# Patient Record
Sex: FEMALE | Race: WHITE
[De-identification: names, ages, dates, MRNs, and addresses within clinical notes are randomized per-mention and may not be internally consistent; named-entity substitution may affect disease eponyms.]

---

## 2020-09-28 ENCOUNTER — HOSPITAL ENCOUNTER (EMERGENCY)
Dept: HOSPITAL 84 - D.ER | Age: 35
Discharge: HOME | End: 2020-09-28
Payer: COMMERCIAL

## 2020-09-28 VITALS — DIASTOLIC BLOOD PRESSURE: 42 MMHG | SYSTOLIC BLOOD PRESSURE: 93 MMHG

## 2020-09-28 VITALS — BODY MASS INDEX: 42.31 KG/M2 | HEIGHT: 67 IN | WEIGHT: 269.57 LBS

## 2020-09-28 DIAGNOSIS — E11.65: ICD-10-CM

## 2020-09-28 DIAGNOSIS — E83.42: ICD-10-CM

## 2020-09-28 DIAGNOSIS — N39.0: ICD-10-CM

## 2020-09-28 DIAGNOSIS — E87.1: ICD-10-CM

## 2020-09-28 DIAGNOSIS — Z79.84: ICD-10-CM

## 2020-09-28 DIAGNOSIS — D72.829: ICD-10-CM

## 2020-09-28 DIAGNOSIS — F32.9: Primary | ICD-10-CM

## 2020-09-28 DIAGNOSIS — K21.9: ICD-10-CM

## 2020-09-28 DIAGNOSIS — Z72.0: ICD-10-CM

## 2020-09-28 DIAGNOSIS — I10: ICD-10-CM

## 2020-09-28 LAB
ALBUMIN SERPL-MCNC: 3.7 G/DL (ref 3.4–5)
ALP SERPL-CCNC: 82 U/L (ref 30–120)
ALT SERPL-CCNC: 27 U/L (ref 10–68)
AMPHETAMINES UR QL SCN: NEGATIVE QUAL
ANION GAP SERPL CALC-SCNC: 12.4 MMOL/L (ref 8–16)
APAP SERPL-MCNC: 0 UG/ML (ref 10–30)
BACTERIA #/AREA URNS HPF: (no result) HPF
BARBITURATES UR QL SCN: NEGATIVE QUAL
BASOPHILS NFR BLD AUTO: 0.1 % (ref 0–2)
BENZODIAZ UR QL SCN: NEGATIVE QUAL
BILIRUB SERPL-MCNC: 0.44 MG/DL (ref 0.2–1.3)
BILIRUB SERPL-MCNC: NEGATIVE MG/DL
BUN SERPL-MCNC: 16 MG/DL (ref 7–18)
BZE UR QL SCN: NEGATIVE QUAL
CALCIUM SERPL-MCNC: 9.2 MG/DL (ref 8.5–10.1)
CANNABINOIDS UR QL SCN: POSITIVE QUAL
CHLORIDE SERPL-SCNC: 102 MMOL/L (ref 98–107)
CO2 SERPL-SCNC: 22.8 MMOL/L (ref 21–32)
CREAT SERPL-MCNC: 0.8 MG/DL (ref 0.6–1.3)
EOSINOPHIL NFR BLD: 1.9 % (ref 0–7)
ERYTHROCYTE [DISTWIDTH] IN BLOOD BY AUTOMATED COUNT: 14.3 % (ref 11.5–14.5)
ETHANOL SERPL-MCNC: 0 MG/DL (ref 0–10)
GLOBULIN SER-MCNC: 4.4 G/L
GLUCOSE SERPL-MCNC: 142 MG/DL (ref 74–106)
HCG UR QL: NEGATIVE
HCT VFR BLD CALC: 41.1 % (ref 36–48)
HGB BLD-MCNC: 13.6 G/DL (ref 12–16)
IMM GRANULOCYTES NFR BLD: 1.1 % (ref 0–5)
KETONES UR STRIP-MCNC: NEGATIVE MG/DL
LYMPHOCYTES NFR BLD AUTO: 19.6 % (ref 15–50)
MAGNESIUM SERPL-MCNC: 1.7 MG/DL (ref 1.8–2.4)
MCH RBC QN AUTO: 27.4 PG (ref 26–34)
MCHC RBC AUTO-ENTMCNC: 33.1 G/DL (ref 31–37)
MCV RBC: 82.9 FL (ref 80–100)
MONOCYTES NFR BLD: 6 % (ref 2–11)
NEUTROPHILS NFR BLD AUTO: 71.3 % (ref 40–80)
NITRITE UR-MCNC: NEGATIVE MG/ML
OPIATES UR QL SCN: NEGATIVE QUAL
OSMOLALITY SERPL CALC.SUM OF ELEC: 268 MOSM/KG (ref 275–300)
PCP UR QL SCN: NEGATIVE QUAL
PH UR STRIP: 5 [PH] (ref 5–8)
PLATELET # BLD: 414 10X3/UL (ref 130–400)
PMV BLD AUTO: 9 FL (ref 7.4–10.4)
POTASSIUM SERPL-SCNC: 4.2 MMOL/L (ref 3.5–5.1)
PROT SERPL-MCNC: 8.1 G/DL (ref 6.4–8.2)
RBC # BLD AUTO: 4.96 10X6/UL (ref 4–5.4)
SODIUM SERPL-SCNC: 133 MMOL/L (ref 136–145)
SQUAMOUS #/AREA URNS HPF: (no result) /HPF (ref 0–5)
UROBILINOGEN UR-MCNC: NORMAL MG/DL (ref ?–2)
WBC # BLD AUTO: 15.5 10X3/UL (ref 4.8–10.8)
WBC #/AREA URNS HPF: (no result) HPF (ref 0–4)

## 2020-09-28 NOTE — NUR
DR. AARON NOTIFIED AND REVIEWED PT'S BEHAVIOR AND ASSESSMENT RESULTS. PT IS
A LOW RISK PER DR. AARON. DR. AARON STATED TO GIVE RESOUCRCES TO PT AT
TIME OF DISCHARGE. RESOUCRES REVIEWED AND PT VERBALIZIED UNDERSTANDING. PT
DENIES SI AT THIS TIME. PT REPORTS " I JUST NEED MY MEDICATIONS ADJUSTED."
CHARGE NURSE AND ATTENDING MD NOTIFIED.